# Patient Record
Sex: MALE | Race: WHITE | NOT HISPANIC OR LATINO | Employment: FULL TIME | URBAN - METROPOLITAN AREA
[De-identification: names, ages, dates, MRNs, and addresses within clinical notes are randomized per-mention and may not be internally consistent; named-entity substitution may affect disease eponyms.]

---

## 2021-04-03 ENCOUNTER — APPOINTMENT (OUTPATIENT)
Dept: RADIOLOGY | Facility: MEDICAL CENTER | Age: 46
End: 2021-04-03
Attending: EMERGENCY MEDICINE
Payer: COMMERCIAL

## 2021-04-03 ENCOUNTER — HOSPITAL ENCOUNTER (EMERGENCY)
Facility: MEDICAL CENTER | Age: 46
End: 2021-04-03
Attending: EMERGENCY MEDICINE | Admitting: EMERGENCY MEDICINE
Payer: COMMERCIAL

## 2021-04-03 VITALS
WEIGHT: 315 LBS | OXYGEN SATURATION: 96 % | HEART RATE: 117 BPM | TEMPERATURE: 99 F | RESPIRATION RATE: 20 BRPM | SYSTOLIC BLOOD PRESSURE: 164 MMHG | DIASTOLIC BLOOD PRESSURE: 107 MMHG

## 2021-04-03 DIAGNOSIS — R73.9 HYPERGLYCEMIA: ICD-10-CM

## 2021-04-03 DIAGNOSIS — I10 ESSENTIAL HYPERTENSION: ICD-10-CM

## 2021-04-03 DIAGNOSIS — M79.89 TOE SWELLING: ICD-10-CM

## 2021-04-03 DIAGNOSIS — F10.920 ALCOHOLIC INTOXICATION WITHOUT COMPLICATION (HCC): ICD-10-CM

## 2021-04-03 LAB
ALBUMIN SERPL BCP-MCNC: 3.9 G/DL (ref 3.2–4.9)
ALBUMIN/GLOB SERPL: 1.1 G/DL
ALP SERPL-CCNC: 76 U/L (ref 30–99)
ALT SERPL-CCNC: 28 U/L (ref 2–50)
ANION GAP SERPL CALC-SCNC: 17 MMOL/L (ref 7–16)
AST SERPL-CCNC: 21 U/L (ref 12–45)
BASOPHILS # BLD AUTO: 1 % (ref 0–1.8)
BASOPHILS # BLD: 0.09 K/UL (ref 0–0.12)
BILIRUB SERPL-MCNC: 0.6 MG/DL (ref 0.1–1.5)
BUN SERPL-MCNC: 12 MG/DL (ref 8–22)
CALCIUM SERPL-MCNC: 8.1 MG/DL (ref 8.5–10.5)
CHLORIDE SERPL-SCNC: 95 MMOL/L (ref 96–112)
CO2 SERPL-SCNC: 22 MMOL/L (ref 20–33)
CREAT SERPL-MCNC: 0.79 MG/DL (ref 0.5–1.4)
CRP SERPL HS-MCNC: 2.58 MG/DL (ref 0–0.75)
EOSINOPHIL # BLD AUTO: 0.04 K/UL (ref 0–0.51)
EOSINOPHIL NFR BLD: 0.5 % (ref 0–6.9)
ERYTHROCYTE [DISTWIDTH] IN BLOOD BY AUTOMATED COUNT: 47.5 FL (ref 35.9–50)
ERYTHROCYTE [SEDIMENTATION RATE] IN BLOOD BY WESTERGREN METHOD: 11 MM/HOUR (ref 0–15)
GLOBULIN SER CALC-MCNC: 3.7 G/DL (ref 1.9–3.5)
GLUCOSE BLD-MCNC: 324 MG/DL (ref 65–99)
GLUCOSE SERPL-MCNC: 402 MG/DL (ref 65–99)
HCT VFR BLD AUTO: 46.9 % (ref 42–52)
HGB BLD-MCNC: 16.2 G/DL (ref 14–18)
IMM GRANULOCYTES # BLD AUTO: 0.07 K/UL (ref 0–0.11)
IMM GRANULOCYTES NFR BLD AUTO: 0.8 % (ref 0–0.9)
LYMPHOCYTES # BLD AUTO: 2.67 K/UL (ref 1–4.8)
LYMPHOCYTES NFR BLD: 30.1 % (ref 22–41)
MCH RBC QN AUTO: 34.3 PG (ref 27–33)
MCHC RBC AUTO-ENTMCNC: 34.5 G/DL (ref 33.7–35.3)
MCV RBC AUTO: 99.4 FL (ref 81.4–97.8)
MONOCYTES # BLD AUTO: 0.86 K/UL (ref 0–0.85)
MONOCYTES NFR BLD AUTO: 9.7 % (ref 0–13.4)
NEUTROPHILS # BLD AUTO: 5.15 K/UL (ref 1.82–7.42)
NEUTROPHILS NFR BLD: 57.9 % (ref 44–72)
NRBC # BLD AUTO: 0.03 K/UL
NRBC BLD-RTO: 0.3 /100 WBC
PLATELET # BLD AUTO: 319 K/UL (ref 164–446)
PMV BLD AUTO: 9.2 FL (ref 9–12.9)
POTASSIUM SERPL-SCNC: 4.2 MMOL/L (ref 3.6–5.5)
PROT SERPL-MCNC: 7.6 G/DL (ref 6–8.2)
RBC # BLD AUTO: 4.72 M/UL (ref 4.7–6.1)
SODIUM SERPL-SCNC: 134 MMOL/L (ref 135–145)
WBC # BLD AUTO: 8.9 K/UL (ref 4.8–10.8)

## 2021-04-03 PROCEDURE — 82962 GLUCOSE BLOOD TEST: CPT

## 2021-04-03 PROCEDURE — A9270 NON-COVERED ITEM OR SERVICE: HCPCS | Performed by: EMERGENCY MEDICINE

## 2021-04-03 PROCEDURE — 700105 HCHG RX REV CODE 258: Performed by: EMERGENCY MEDICINE

## 2021-04-03 PROCEDURE — 85025 COMPLETE CBC W/AUTO DIFF WBC: CPT

## 2021-04-03 PROCEDURE — 99285 EMERGENCY DEPT VISIT HI MDM: CPT

## 2021-04-03 PROCEDURE — 80053 COMPREHEN METABOLIC PANEL: CPT

## 2021-04-03 PROCEDURE — 700102 HCHG RX REV CODE 250 W/ 637 OVERRIDE(OP): Performed by: EMERGENCY MEDICINE

## 2021-04-03 PROCEDURE — 96372 THER/PROPH/DIAG INJ SC/IM: CPT

## 2021-04-03 PROCEDURE — 36415 COLL VENOUS BLD VENIPUNCTURE: CPT

## 2021-04-03 PROCEDURE — 86140 C-REACTIVE PROTEIN: CPT

## 2021-04-03 PROCEDURE — 85652 RBC SED RATE AUTOMATED: CPT

## 2021-04-03 PROCEDURE — 73630 X-RAY EXAM OF FOOT: CPT | Mod: LT

## 2021-04-03 RX ORDER — SODIUM CHLORIDE, SODIUM LACTATE, POTASSIUM CHLORIDE, CALCIUM CHLORIDE 600; 310; 30; 20 MG/100ML; MG/100ML; MG/100ML; MG/100ML
1000 INJECTION, SOLUTION INTRAVENOUS ONCE
Status: COMPLETED | OUTPATIENT
Start: 2021-04-03 | End: 2021-04-03

## 2021-04-03 RX ORDER — LISINOPRIL 20 MG/1
20 TABLET ORAL ONCE
Status: COMPLETED | OUTPATIENT
Start: 2021-04-03 | End: 2021-04-03

## 2021-04-03 RX ORDER — CEPHALEXIN 500 MG/1
500 CAPSULE ORAL 4 TIMES DAILY
Qty: 28 CAPSULE | Refills: 0 | Status: SHIPPED | OUTPATIENT
Start: 2021-04-03 | End: 2021-04-10

## 2021-04-03 RX ADMIN — LISINOPRIL 20 MG: 20 TABLET ORAL at 22:07

## 2021-04-03 RX ADMIN — INSULIN HUMAN 8 UNITS: 100 INJECTION, SOLUTION PARENTERAL at 21:59

## 2021-04-03 RX ADMIN — SODIUM CHLORIDE, POTASSIUM CHLORIDE, SODIUM LACTATE AND CALCIUM CHLORIDE 1000 ML: 600; 310; 30; 20 INJECTION, SOLUTION INTRAVENOUS at 20:12

## 2021-04-03 NOTE — ED TRIAGE NOTES
Chief Complaint   Patient presents with   • Alcohol Intoxication     Pt BIB EMS, pt at [facility] and asked to leave by staff for intoxication. PD told pt that he needs to either go to hospital or be arrested, pt choose to come to [facility]. Pt from [facility] and in [place] for the weekend, stared drinking after wife left him. pt reports not to drink every day and has friends in town to pick him up when needed.      Pt/staff masked and in appropriate PPE during encounter.

## 2021-04-03 NOTE — ED PROVIDER NOTES
ED Provider Note    CHIEF COMPLAINT  Chief Complaint   Patient presents with   • Alcohol Intoxication     Pt BIB EMS, pt at [facility] and asked to leave by staff for intoxication. PD told pt that he needs to either go to hospital or be arrested, pt [facility]. Pt from [facility] and in [place] for the weekend, stared drinking after wife left him. pt reports not to drink every day and has friends in town to pick him up when needed.        HPI  Rashad Jose is a 45 y.o. male with a history of type 2 diabetes mellitus who presents by EMS for acute alcohol intoxication.  Patient was [place] local [place] and was asked to leave.  When he refused, police were called and he was told he had a choice to go to the hospital or be arrested.  The patient elected to come to the hospital, and was transported by EMS.  On arrival, the patient has no complaints other than he has been drinking heavily for the past 2 days.  He says he is visiting from the [place] area, and his wife left him 2 days ago.  He said he has no complaints other than  having some problems with his penis.  He says it has retracted, but has been able to urinate, denies any dysuria or hematuria or any penile discharge..  He has been circumcised.  He denies any chest pain, shortness of breath, back pain, abdominal pain.    REVIEW OF SYSTEMS  See HPI for further details. All other systems are negative.     PAST MEDICAL HISTORY  History reviewed. No pertinent past medical history.    FAMILY HISTORY  History reviewed. No pertinent family history.    SOCIAL HISTORY  Social History     Tobacco Use   • Smoking status: Never Smoker   • Smokeless tobacco: Never Used   Substance Use Topics   • Alcohol use: Yes   • Drug use: Never      Social History     Substance and Sexual Activity   Drug Use Never       SURGICAL HISTORY  History reviewed. No pertinent surgical history.    CURRENT MEDICATIONS  Home Medications    **Home medications have not yet been  reviewed for this encounter**         ALLERGIES  Allergies   Allergen Reactions   • Morphine      rash   • Motrin [Ibuprofen] Hives       PHYSICAL EXAM0  VITAL SIGNS: Blood Pressure (Abnormal) 180/109   Pulse (Abnormal) 110   Temperature 36.4 °C (97.5 °F) (Temporal)   Respiration 20   Weight (Abnormal) 165 kg (363 lb)   Oxygen Saturation 96%   Constitutional: Awake, alert, in no acute distress, appears mildly intoxicated.  HENT: Atraumatic. Bilateral external ears normal, mucous membranes mildly dry rhythm, tachycardic, rate in the 100s 110s, no murmurs, rubs, gallops., throat nonerythematous without exudates, nose is normal.  Eyes: PERRL, EOMI, conjunctiva moist, noninjected.  Neck: Nontender, Normal range of motion, No nuchal rigidity, No stridor.   Lymphatic: No lymphadenopathy noted.   Cardiovascular: Regular rate and rhythm, no murmurs, rubs, gallops.  Thorax & Lungs:  Good breath sounds bilaterally, no wheezes, rales, or retractions.  No chest tenderness.  Abdomen: Bowel sounds normal, Soft, nontender, nondistended, no rebound, guarding, masses.  Back: No CVA or spinal tenderness.  Extremities: Intact distal pulses, No edema, No tenderness.  There is a callus over the left great toe which is slightly swollen, larger than the left great toe in comparison, without significant erythema, induration, or discharge.  No other erythema noted to the rest of the foot or extremity.  Skin: Warm, Dry, No rashes.   : The penis is somewhat retracted within the abdominal folds, there is no surrounding erythema or induration, retracting the skin folds, the penis can be visualized, with no erythema or induration noted.  Patient is circumcised, there is no phimosis or paraphimosis.  Musculoskeletal: No joint swelling or tenderness.  Neurologic: Alert & oriented x 3, sensory and motor function normal. No focal deficits.   Psychiatric: Patient appears mildly intoxicated, judgment slightly impaired, mood is slightly  elevated.        LABS  Labs Reviewed   CBC WITH DIFFERENTIAL - Abnormal; Notable for the following components:       Result Value    MCV 99.4 (*)     MCH 34.3 (*)     Monos (Absolute) 0.86 (*)     All other components within normal limits   CRP QUANTITIVE (NON-CARDIAC) - Abnormal; Notable for the following components:    Stat C-Reactive Protein 2.58 (*)     All other components within normal limits   COMP METABOLIC PANEL - Abnormal; Notable for the following components:    Sodium 134 (*)     Chloride 95 (*)     Anion Gap 17.0 (*)     Glucose 402 (*)     Calcium 8.1 (*)     Globulin 3.7 (*)     All other components within normal limits   ACCU-CHEK GLUCOSE - Abnormal; Notable for the following components:    Glucose - Accu-Ck 324 (*)     All other components within normal limits   SED RATE   ESTIMATED GFR       All labs reviewed by me.      RADIOLOGY/PROCEDURES  DX-FOOT-COMPLETE 3+ LEFT   Final Result      Medial first distal phalanx bony excrescence most likely is posttraumatic. An enchondroma is possible. This is of no acute significance.      Diffuse soft tissue swelling with some enthesopathy detected          The radiologist's interpretations of all radiological studies have been reviewed by me.        COURSE & MEDICAL DECISION MAKING  Pertinent Labs & Imaging studies reviewed. (See chart for details)  The patient presents with the above complaints.  He is acutely intoxicated and initially was unable to give any much information.  As he sobered up he denied any pain or injuries.  He did asked me to look at his left great toe as he has had problems with a callus and swelling to the toe for several years.  He has diabetic neuropathy, so cannot really feel there is any pain present.  Initial breath alcohol was 0.2, but he appears more intoxicated than that.    CBC showed a white count of 8900, hemoglobin 16.2, platelets 319, normal differential, chemistry shows sodium 134, CO2 22, anion gap 17, glucose 402, normal  renal function and liver function tests.  X-rays of the left foot shows medial first distal phalanx bony excrescence most likely posttraumatic.  No joint space narrowing, no soft tissue gas to support erosion noted.  Because of his elevated blood sugar, patient was bolused with 1 L of lactated Ringer's.  After receiving IV fluids, fingerstick blood sugar was 324.  Patient was given 8 units of regular insulin subcutaneous.  He was given a meal to eat.  He was noted to be hypertensive throughout his stay.  He has been noncompliant with his blood pressure medications.  He was given lisinopril 20 mg orally.    There is no evidence of a acute infection or septic joint to his great toe as he has no fever, normal white count, normal sedimentation rate, and no acute changes on x-ray.  He did have an elevated C-reactive protein, so he will be placed on a course of Keflex.  The patient will be discharged with instructions to follow-up with his PCP when he returns to the [place] area.  He is to return to the ER for any increase redness, swelling, discharge from the great toe, fever, uncontrolled blood sugar, or any other problems.    FINAL IMPRESSION  1. Alcoholic intoxication without complication (HCC)    2. Hyperglycemia    3. Toe swelling    4. Essential hypertension          Electronically signed by: Ron Hamlin M.D., 4/3/2021 3:09 PM

## 2021-04-04 NOTE — ED NOTES
Report received from DAVID Lazaro. Assumed care of patient. Pt assessed, AAO x 4 . Patient's concerns addressed. Patient aware of POC. Fall precautions in place.  Pt repositioned and comfortable.  Call light within reach. Will continue to monitor. This RN masked and in appropriate PPE during encounter.

## 2021-04-04 NOTE — ED NOTES
Pt provided discharge instructions. Pt verbalized understanding. Pt leaving ER in stable condition. Wristband and IV removed.

## 2021-04-04 NOTE — ED NOTES
Medicated pt per MAR. ERP at bedside. Pt to be discharged after medication. Patient's aunt notified to come pick him up.

## 2021-04-04 NOTE — DISCHARGE INSTRUCTIONS
Call your doctor Monday for an appointment.  Return to the ER for any fever, increased pain, redness, swelling to the toe, vomiting, uncontrolled blood sugar, or any other problems.